# Patient Record
Sex: MALE | Race: BLACK OR AFRICAN AMERICAN | NOT HISPANIC OR LATINO | Employment: FULL TIME | URBAN - METROPOLITAN AREA
[De-identification: names, ages, dates, MRNs, and addresses within clinical notes are randomized per-mention and may not be internally consistent; named-entity substitution may affect disease eponyms.]

---

## 2024-01-16 ENCOUNTER — APPOINTMENT (OUTPATIENT)
Dept: PRIMARY CARE | Facility: CLINIC | Age: 29
End: 2024-01-16
Payer: COMMERCIAL

## 2024-02-02 ENCOUNTER — OFFICE VISIT (OUTPATIENT)
Dept: PRIMARY CARE | Facility: CLINIC | Age: 29
End: 2024-02-02
Payer: COMMERCIAL

## 2024-02-02 VITALS
BODY MASS INDEX: 27.15 KG/M2 | OXYGEN SATURATION: 95 % | WEIGHT: 173 LBS | HEIGHT: 67 IN | DIASTOLIC BLOOD PRESSURE: 74 MMHG | TEMPERATURE: 97.8 F | HEART RATE: 78 BPM | SYSTOLIC BLOOD PRESSURE: 131 MMHG

## 2024-02-02 DIAGNOSIS — R55 SYNCOPE, UNSPECIFIED SYNCOPE TYPE: Primary | ICD-10-CM

## 2024-02-02 PROCEDURE — 1036F TOBACCO NON-USER: CPT | Performed by: FAMILY MEDICINE

## 2024-02-02 PROCEDURE — 93000 ELECTROCARDIOGRAM COMPLETE: CPT | Performed by: FAMILY MEDICINE

## 2024-02-02 PROCEDURE — 99214 OFFICE O/P EST MOD 30 MIN: CPT | Performed by: FAMILY MEDICINE

## 2024-02-02 ASSESSMENT — LIFESTYLE VARIABLES
HOW OFTEN DO YOU HAVE A DRINK CONTAINING ALCOHOL: NEVER
HOW MANY STANDARD DRINKS CONTAINING ALCOHOL DO YOU HAVE ON A TYPICAL DAY: PATIENT DOES NOT DRINK
AUDIT-C TOTAL SCORE: 0
HOW OFTEN DO YOU HAVE SIX OR MORE DRINKS ON ONE OCCASION: NEVER
SKIP TO QUESTIONS 9-10: 1

## 2024-02-02 ASSESSMENT — COLUMBIA-SUICIDE SEVERITY RATING SCALE - C-SSRS
6. HAVE YOU EVER DONE ANYTHING, STARTED TO DO ANYTHING, OR PREPARED TO DO ANYTHING TO END YOUR LIFE?: NO
2. HAVE YOU ACTUALLY HAD ANY THOUGHTS OF KILLING YOURSELF?: NO
1. IN THE PAST MONTH, HAVE YOU WISHED YOU WERE DEAD OR WISHED YOU COULD GO TO SLEEP AND NOT WAKE UP?: NO

## 2024-02-02 ASSESSMENT — PATIENT HEALTH QUESTIONNAIRE - PHQ9
2. FEELING DOWN, DEPRESSED OR HOPELESS: NOT AT ALL
SUM OF ALL RESPONSES TO PHQ9 QUESTIONS 1 & 2: 0
1. LITTLE INTEREST OR PLEASURE IN DOING THINGS: NOT AT ALL

## 2024-02-02 NOTE — PROGRESS NOTES
"1 year ago had 2 episodes of syncope both happened at work after prolonged standing for greater than an hour preceded by diaphoresis but not by chest pain palpitations nausea.  Denied incontinence of urine incontinence of stool tongue biting witnessed seizure activity headache dysarthria aphasia focal weakness.    Currently doing well working and exercising without difficulty      /74   Pulse 78   Temp 36.6 °C (97.8 °F)   Ht 1.702 m (5' 7\")   Wt 78.5 kg (173 lb)   SpO2 95%   BMI 27.10 kg/m²   Lying blood pressure 126/74 with a pulse of 68   sitting blood pressure 123/79 with a pulse of 71   standing blood pressure 129/75 with a pulse of 73    Appears comfortable  No retractions  Skin without pallor petechia icterus cyanosis  Neck without JVD thyromegaly bruits  Chest wall nontender  Chest clear to auscultation without rales rhonchi wheeze  Heart regular rate and rhythm without murmur  Abdomen soft nondistended nontender without organomegaly or mass  Extremities without erythema edema Homans or cord  Peripheral pulses palpable  Neuro intact      [facility] forms filled out    "

## 2024-02-05 PROBLEM — J06.9 UPPER RESPIRATORY TRACT INFECTION: Status: ACTIVE | Noted: 2024-02-05

## 2024-02-05 PROBLEM — U09.9 POST-ACUTE COVID-19 SYNDROME: Status: ACTIVE | Noted: 2024-02-05

## 2024-02-05 PROBLEM — R55 VASOVAGAL SYMPTOM: Status: ACTIVE | Noted: 2024-02-05

## 2024-02-05 PROBLEM — U07.1 COVID-19: Status: ACTIVE | Noted: 2024-02-05

## 2024-02-05 PROBLEM — S43.439A GLENOID LABRUM TEAR: Status: ACTIVE | Noted: 2024-02-05

## 2024-02-05 PROBLEM — H60.509 ACUTE OTITIS EXTERNA: Status: ACTIVE | Noted: 2024-02-05

## 2024-02-05 PROBLEM — M79.10 MUSCLE PAIN: Status: ACTIVE | Noted: 2024-02-05

## 2024-02-05 PROBLEM — L72.0 EPIDERMOID CYST: Status: ACTIVE | Noted: 2024-02-05

## 2024-02-09 ENCOUNTER — APPOINTMENT (OUTPATIENT)
Dept: PRIMARY CARE | Facility: CLINIC | Age: 29
End: 2024-02-09
Payer: COMMERCIAL

## 2024-02-09 ENCOUNTER — APPOINTMENT (OUTPATIENT)
Dept: CARDIOLOGY | Facility: CLINIC | Age: 29
End: 2024-02-09
Payer: COMMERCIAL

## 2024-02-16 ENCOUNTER — OFFICE VISIT (OUTPATIENT)
Dept: CARDIOLOGY | Facility: CLINIC | Age: 29
End: 2024-02-16
Payer: COMMERCIAL

## 2024-02-16 ENCOUNTER — APPOINTMENT (OUTPATIENT)
Dept: PRIMARY CARE | Facility: CLINIC | Age: 29
End: 2024-02-16
Payer: COMMERCIAL

## 2024-02-16 VITALS
SYSTOLIC BLOOD PRESSURE: 116 MMHG | RESPIRATION RATE: 16 BRPM | WEIGHT: 173 LBS | OXYGEN SATURATION: 98 % | HEART RATE: 103 BPM | BODY MASS INDEX: 27.1 KG/M2 | DIASTOLIC BLOOD PRESSURE: 82 MMHG

## 2024-02-16 DIAGNOSIS — R55 SYNCOPE, UNSPECIFIED SYNCOPE TYPE: ICD-10-CM

## 2024-02-16 DIAGNOSIS — R55 VASOVAGAL SYMPTOM: Primary | ICD-10-CM

## 2024-02-16 PROCEDURE — 1036F TOBACCO NON-USER: CPT | Performed by: STUDENT IN AN ORGANIZED HEALTH CARE EDUCATION/TRAINING PROGRAM

## 2024-02-16 PROCEDURE — 99203 OFFICE O/P NEW LOW 30 MIN: CPT | Performed by: STUDENT IN AN ORGANIZED HEALTH CARE EDUCATION/TRAINING PROGRAM

## 2024-02-16 ASSESSMENT — ENCOUNTER SYMPTOMS
PALPITATIONS: 0
HEMATOLOGIC/LYMPHATIC NEGATIVE: 1
RESPIRATORY NEGATIVE: 1
PND: 0
EYES NEGATIVE: 1
DYSPNEA ON EXERTION: 0
ORTHOPNEA: 0
SYNCOPE: 1
ALLERGIC/IMMUNOLOGIC NEGATIVE: 1
ENDOCRINE NEGATIVE: 1
GASTROINTESTINAL NEGATIVE: 1
PSYCHIATRIC NEGATIVE: 1
NEAR-SYNCOPE: 0
CONSTITUTIONAL NEGATIVE: 1
MUSCULOSKELETAL NEGATIVE: 1

## 2024-02-16 NOTE — PATIENT INSTRUCTIONS
We will see you back in heart clinic after your heart ultrasound (echocardiogram). Most likely your passing out episodes were from vaso-vagal syncope (reflex syncope).     If you have any further syncope we would then consider a heart monitor.     If you have any questions; please call my nurse Perlita; her contact information is below.     Thank you for your visit today. Please contact our office (via MyChart or phone) with any additional questions.     [facility]    Perlita, RN/Clinic Nurse for:    Dr. Pauline Ritter    [address], [address]  [place] [address]    Phone: [phone] Press Option 5 then Option 3 to speak with the Clinic Nurse (Perlita)    _____    To Reach:    Billing Questions -    [phone]  Scheduling / Rescheduling -  Option 1  Refills / Medication Requests -  Option 3  General Office /  -  Option 4  Results -     Option 6  Medical Records -    Option 7  Repeat Options -    Option 9

## 2024-02-16 NOTE — PROGRESS NOTES
" Cardiology New Patient History and Physical    Reason for referral: syncope    HPI: Maco Quiros is a 28 y.o.  male who presents today for history of syncope. Past medical history of syncope (June / July 2023), hx SARS-COVID; hx ADD (previously on Adderall).     Maco presented to cardiology clinic on 2/16/2024.  Notes syncopal episodes in June / July 2023. Syncopal events occurred at work while standing; felt \"warm / hot / distant sounds\" just prior to episodes.  Patient had been on feet for ~ 30-45 minutes.  Per patient he was unconscious for ~ couple of seconds.  Felt back to normal ~ 30 minutes after events.  No further episodes.  Feeling well otherwise.     No known family history of cardiac issues (adopted). No personal history of arrhythmias.  No history of VTE.      Past Medical History:   - As above    Surgical History:   He has a past surgical history that includes Other surgical history (11/16/2021).    Family History:   Family History   Problem Relation Name Age of Onset    No Known Problems Mother      No Known Problems Father       Unknown: adopted    Allergies:  Pollen extracts     Social History:   - non-smoker; rare alcohol use; no illicit drug  - Employed: works at [employer]    Prior Cardiovascular Testing (personally reviewed):     ECG (2/2/2024)- Normal sinus rhythm    Review of Systems:  Review of Systems   Constitutional: Negative.   HENT: Negative.     Eyes: Negative.    Cardiovascular:  Positive for syncope. Negative for chest pain, dyspnea on exertion, near-syncope, orthopnea, palpitations and paroxysmal nocturnal dyspnea.        Hx of two syncopal events in June / July 2023   Respiratory: Negative.     Endocrine: Negative.    Hematologic/Lymphatic: Negative.    Skin: Negative.    Musculoskeletal: Negative.    Gastrointestinal: Negative.    Genitourinary: Negative.    Psychiatric/Behavioral: Negative.     Allergic/Immunologic: Negative.        Objective     Outpatient " Medications:  No current outpatient medications on file.     Last Recorded Vitals  /82 (BP Location: Right arm, Patient Position: Sitting)   Pulse 103   Resp 16   Wt 78.5 kg (173 lb)   SpO2 98%   BMI 27.10 kg/m²     Physical Exam:  Physical Exam  Constitutional:       General: He is not in acute distress.  HENT:      Head: Normocephalic.      Mouth/Throat:      Mouth: Mucous membranes are moist.   Eyes:      Extraocular Movements: Extraocular movements intact.      Conjunctiva/sclera: Conjunctivae normal.   Neck:      Vascular: No JVD.   Cardiovascular:      Rate and Rhythm: Normal rate and regular rhythm.      Heart sounds: No murmur heard.  Pulmonary:      Effort: Pulmonary effort is normal. No respiratory distress.      Breath sounds: Normal breath sounds.   Abdominal:      General: Bowel sounds are normal. There is no distension.      Palpations: Abdomen is soft.   Musculoskeletal:         General: No swelling.   Skin:     General: Skin is warm and dry.   Neurological:      General: No focal deficit present.      Mental Status: He is alert.      Cranial Nerves: No cranial nerve deficit.      Motor: No weakness.   Psychiatric:         Mood and Affect: Mood normal.         Behavior: Behavior normal.       Lab Review:    Lab Results   Component Value Date    GLUCOSE 67 (L) 11/16/2021    CALCIUM 9.2 11/16/2021     11/16/2021    K 4.3 11/16/2021    CO2 32 11/16/2021     11/16/2021    BUN 8 11/16/2021    CREATININE 0.93 11/16/2021       Lab Results   Component Value Date    WBC 2.5 (L) 11/16/2021    HGB 12.7 (L) 11/16/2021    HCT 38.2 (L) 11/16/2021    MCV 80 11/16/2021     11/16/2021         Lab Results   Component Value Date    TSH 0.87 11/16/2021       Assessment:   28 y.o.  male who presents today for history of syncope. Past medical history of syncope (June / July 2023), hx SARS-COVID; hx ADD (previously on Adderall).     Patient with multiple syncopal episodes in  "summer 2023.  Episodes consistent with vasovagal syncope.  Structural heart disease transthoracic echocardiogram.  Patient had no further syncopal episodes but then recommend a 14-day Holter monitor to exclude occult arrhythmias.    Overall Plan:  1.  Syncope, most likely vasovagal  - Check transthoracic echocardiogram to exclude structural heart abnormalities  - Patient has recurrent syncope ordered for today Holter monitor to exclude occult arrhythmia    2. Discussed \"red flag\" symptoms that should prompt immediate medical attention; patient verbalized understanding      3.  Health maintenance-agree with checking fasting lipid    Disposition: Return to cardiology clinic after TTE    Jakub Carpenter MD        "

## 2024-03-22 ENCOUNTER — APPOINTMENT (OUTPATIENT)
Dept: CARDIOLOGY | Facility: CLINIC | Age: 29
End: 2024-03-22
Payer: COMMERCIAL

## 2024-03-29 ENCOUNTER — HOSPITAL ENCOUNTER (OUTPATIENT)
Dept: CARDIOLOGY | Facility: CLINIC | Age: 29
Discharge: HOME | End: 2024-03-29
Payer: COMMERCIAL

## 2024-03-29 DIAGNOSIS — R55 SYNCOPE, UNSPECIFIED SYNCOPE TYPE: ICD-10-CM

## 2024-03-29 PROCEDURE — 93306 TTE W/DOPPLER COMPLETE: CPT

## 2024-03-29 PROCEDURE — 93306 TTE W/DOPPLER COMPLETE: CPT | Performed by: STUDENT IN AN ORGANIZED HEALTH CARE EDUCATION/TRAINING PROGRAM

## 2024-03-30 LAB
AORTIC VALVE MEAN GRADIENT: 3.3 MMHG
AORTIC VALVE PEAK VELOCITY: 1.21 M/S
AV PEAK GRADIENT: 5.9 MMHG
AVA (PEAK VEL): 2.51 CM2
AVA (VTI): 2.15 CM2
EJECTION FRACTION APICAL 4 CHAMBER: 65.3
EJECTION FRACTION: 61 %
LEFT VENTRICLE INTERNAL DIMENSION DIASTOLE: 4.39 CM (ref 3.5–6)
LEFT VENTRICULAR OUTFLOW TRACT DIAMETER: 2.03 CM
MITRAL VALVE E/A RATIO: 1.17
RIGHT VENTRICLE FREE WALL PEAK S': 0.12 CM/S
RIGHT VENTRICLE PEAK SYSTOLIC PRESSURE: 17.4 MMHG
TRICUSPID ANNULAR PLANE SYSTOLIC EXCURSION: 1.7 CM

## 2024-04-02 ENCOUNTER — TELEPHONE (OUTPATIENT)
Dept: CARDIOLOGY | Facility: CLINIC | Age: 29
End: 2024-04-02
Payer: COMMERCIAL

## 2024-04-02 NOTE — TELEPHONE ENCOUNTER
----- Message from Jakub Carpenter MD sent at 4/2/2024  2:10 PM EDT -----  Please let patient know his echocardiogram was normal.     Jakub Carpenter MD    ----- Message -----  From: Raymond Syngo - Cardiology Results In  Sent: 3/30/2024   3:15 PM EDT  To: Jakub Carpenter MD

## 2024-04-05 ENCOUNTER — APPOINTMENT (OUTPATIENT)
Dept: CARDIOLOGY | Facility: CLINIC | Age: 29
End: 2024-04-05
Payer: COMMERCIAL

## 2024-05-06 ENCOUNTER — OFFICE VISIT (OUTPATIENT)
Dept: PRIMARY CARE | Facility: CLINIC | Age: 29
End: 2024-05-06
Payer: COMMERCIAL

## 2024-05-06 VITALS
BODY MASS INDEX: 25.9 KG/M2 | SYSTOLIC BLOOD PRESSURE: 138 MMHG | WEIGHT: 165 LBS | OXYGEN SATURATION: 96 % | DIASTOLIC BLOOD PRESSURE: 72 MMHG | HEIGHT: 67 IN | TEMPERATURE: 97.4 F | HEART RATE: 69 BPM

## 2024-05-06 DIAGNOSIS — S01.00XD OPEN WOUND OF SCALP, UNSPECIFIED OPEN WOUND TYPE, SUBSEQUENT ENCOUNTER: ICD-10-CM

## 2024-05-06 DIAGNOSIS — L65.9 ALOPECIA: Primary | ICD-10-CM

## 2024-05-06 PROCEDURE — 1036F TOBACCO NON-USER: CPT | Performed by: FAMILY MEDICINE

## 2024-05-06 PROCEDURE — 99213 OFFICE O/P EST LOW 20 MIN: CPT | Performed by: FAMILY MEDICINE

## 2024-05-06 ASSESSMENT — COLUMBIA-SUICIDE SEVERITY RATING SCALE - C-SSRS
2. HAVE YOU ACTUALLY HAD ANY THOUGHTS OF KILLING YOURSELF?: NO
6. HAVE YOU EVER DONE ANYTHING, STARTED TO DO ANYTHING, OR PREPARED TO DO ANYTHING TO END YOUR LIFE?: NO
1. IN THE PAST MONTH, HAVE YOU WISHED YOU WERE DEAD OR WISHED YOU COULD GO TO SLEEP AND NOT WAKE UP?: NO

## 2024-05-06 NOTE — PROGRESS NOTES
"29-year-old male here for wound check after obtaining multiunit  On 4/26/2024 currently doing well    Following wound care procedures    Denies redness swelling discharge from occipital wound or from recipient sites    He was instructed not to work until sutures are removed which will be done on May 11          /72   Pulse 69   Temp 36.3 °C (97.4 °F)   Ht 1.702 m (5' 7\")   Wt 74.8 kg (165 lb)   SpO2 96%   BMI 25.84 kg/m²       10 inch plus occipital wound closed with interrupted sutures with wound well-approximated without redness swelling or discharge.  Neck without adenopathy.  Neck supple  "

## 2024-05-06 NOTE — PATIENT INSTRUCTIONS
Continue wound care.  Follow-up with his dermatologist office for suture excision may resume work on May 12

## 2024-05-17 ENCOUNTER — OFFICE VISIT (OUTPATIENT)
Dept: PRIMARY CARE | Facility: CLINIC | Age: 29
End: 2024-05-17
Payer: COMMERCIAL

## 2024-05-17 VITALS
OXYGEN SATURATION: 95 % | HEIGHT: 67 IN | TEMPERATURE: 97.2 F | WEIGHT: 168 LBS | BODY MASS INDEX: 26.37 KG/M2 | SYSTOLIC BLOOD PRESSURE: 117 MMHG | DIASTOLIC BLOOD PRESSURE: 74 MMHG | HEART RATE: 65 BPM

## 2024-05-17 DIAGNOSIS — L65.9 ALOPECIA: Primary | ICD-10-CM

## 2024-05-17 PROCEDURE — 99213 OFFICE O/P EST LOW 20 MIN: CPT | Performed by: FAMILY MEDICINE

## 2024-05-17 ASSESSMENT — PATIENT HEALTH QUESTIONNAIRE - PHQ9
SUM OF ALL RESPONSES TO PHQ9 QUESTIONS 1 & 2: 0
2. FEELING DOWN, DEPRESSED OR HOPELESS: NOT AT ALL
1. LITTLE INTEREST OR PLEASURE IN DOING THINGS: NOT AT ALL

## 2024-05-17 ASSESSMENT — COLUMBIA-SUICIDE SEVERITY RATING SCALE - C-SSRS
2. HAVE YOU ACTUALLY HAD ANY THOUGHTS OF KILLING YOURSELF?: NO
1. IN THE PAST MONTH, HAVE YOU WISHED YOU WERE DEAD OR WISHED YOU COULD GO TO SLEEP AND NOT WAKE UP?: NO
6. HAVE YOU EVER DONE ANYTHING, STARTED TO DO ANYTHING, OR PREPARED TO DO ANYTHING TO END YOUR LIFE?: NO

## 2024-05-17 NOTE — PROGRESS NOTES
29-year-old male here for wound check after obtaining multiunit  On 4/26/2024 currently doing well    Following wound care procedures    Denies redness swelling discharge from occipital wound or from recipient sites  Sutures were removed May 11 without difficulty      .mfvitals    10 inch plus occipital wound well-approximated without redness swelling or discharge.  Neck without adenopathy.  Neck supple

## 2024-05-24 ENCOUNTER — APPOINTMENT (OUTPATIENT)
Dept: CARDIOLOGY | Facility: CLINIC | Age: 29
End: 2024-05-24
Payer: COMMERCIAL

## 2024-06-14 ENCOUNTER — APPOINTMENT (OUTPATIENT)
Dept: CARDIOLOGY | Facility: CLINIC | Age: 29
End: 2024-06-14
Payer: COMMERCIAL

## 2024-07-03 ENCOUNTER — APPOINTMENT (OUTPATIENT)
Dept: CARDIOLOGY | Facility: CLINIC | Age: 29
End: 2024-07-03
Payer: COMMERCIAL

## 2024-07-05 ENCOUNTER — TELEPHONE (OUTPATIENT)
Dept: CARDIOLOGY | Facility: CLINIC | Age: 29
End: 2024-07-05

## 2024-07-05 NOTE — TELEPHONE ENCOUNTER
Patient left message need to schedule a follow up missed appointment. Patient was called left message on his voice machine to call back and schedule .

## 2024-08-23 ENCOUNTER — APPOINTMENT (OUTPATIENT)
Dept: CARDIOLOGY | Facility: CLINIC | Age: 29
End: 2024-08-23
Payer: COMMERCIAL

## 2024-08-23 DIAGNOSIS — R55 VASOVAGAL SYMPTOM: ICD-10-CM

## 2024-08-23 DIAGNOSIS — R55 SYNCOPE, UNSPECIFIED SYNCOPE TYPE: ICD-10-CM

## 2024-08-23 PROCEDURE — 99212 OFFICE O/P EST SF 10 MIN: CPT | Performed by: STUDENT IN AN ORGANIZED HEALTH CARE EDUCATION/TRAINING PROGRAM

## 2024-10-10 ASSESSMENT — ENCOUNTER SYMPTOMS
ORTHOPNEA: 0
MUSCULOSKELETAL NEGATIVE: 1
RESPIRATORY NEGATIVE: 1
HEMATOLOGIC/LYMPHATIC NEGATIVE: 1
PND: 0
SYNCOPE: 0
PALPITATIONS: 0
ENDOCRINE NEGATIVE: 1
GASTROINTESTINAL NEGATIVE: 1
ALLERGIC/IMMUNOLOGIC NEGATIVE: 1
CONSTITUTIONAL NEGATIVE: 1
PSYCHIATRIC NEGATIVE: 1
NEAR-SYNCOPE: 0
EYES NEGATIVE: 1
DYSPNEA ON EXERTION: 0

## 2024-10-10 NOTE — PROGRESS NOTES
" Cardiology Established Outpatient Visit    Reason for referral: syncope    HPI: Maco Quiros \"Mike\" is a 29 y.o.  male who presents today for history of syncope. Past medical history of syncope (June / July 2023), hx SARS-COVID; hx ADD (previously on Adderall).     Maco presented to cardiology clinic on 2/16/2024.  Notes syncopal episodes in June / July 2023. Syncopal events occurred at work while standing; felt \"warm / hot / distant sounds\" just prior to episodes.  Patient had been on feet for ~ 30-45 minutes.  Per patient he was unconscious for ~ couple of seconds.  Felt back to normal ~ 30 minutes after events.  No further episodes.  Feeling well otherwise.  No known family history of cardiac issues (adopted). No personal history of arrhythmias.  No history of VTE.      Mike returned to cardiology clinic on 8/23/2024.  Transthoracic echocardiogram showed normal cardiac structure and function.  No further episodes of syncope prior episode thought to be vasovagal in nature.  Discussed the option of Holter monitor if symptoms return; patient like to defer for now.  Follow-up as needed in cardiology clinic.    Past Medical History:   - As above    Surgical History:   He has a past surgical history that includes Other surgical history (11/16/2021).    Family History:   Family History   Problem Relation Name Age of Onset    No Known Problems Mother      No Known Problems Father       Unknown: adopted    Allergies:  Pollen extracts     Social History:   - non-smoker; rare alcohol use; no illicit drug  - Employed: works at [employer]    Prior Cardiovascular Testing (personally reviewed):     TTE (3/29/2024)   1. Normal Echocardiogram.   2. Left ventricular systolic function is normal with a 60-65% estimated ejection fraction.   3. RVSP within normal limits.    ECG (2/2/2024)- Normal sinus rhythm    Review of Systems:  Review of Systems   Constitutional: Negative.   HENT: Negative.     Eyes: Negative.  "   Cardiovascular:  Negative for chest pain, dyspnea on exertion, near-syncope, orthopnea, palpitations, paroxysmal nocturnal dyspnea and syncope.        Hx of two syncopal events in June / July 2023   Respiratory: Negative.     Endocrine: Negative.    Hematologic/Lymphatic: Negative.    Skin: Negative.    Musculoskeletal: Negative.    Gastrointestinal: Negative.    Genitourinary: Negative.    Psychiatric/Behavioral: Negative.     Allergic/Immunologic: Negative.        Objective     Outpatient Medications:  No current outpatient medications on file.     Last Recorded Vitals  There were no vitals taken for this visit.    Physical Exam:  Physical Exam  Constitutional:       General: He is not in acute distress.  HENT:      Head: Normocephalic.      Mouth/Throat:      Mouth: Mucous membranes are moist.   Eyes:      Extraocular Movements: Extraocular movements intact.      Conjunctiva/sclera: Conjunctivae normal.   Neck:      Vascular: No carotid bruit or JVD.   Cardiovascular:      Rate and Rhythm: Normal rate and regular rhythm.      Heart sounds: No murmur heard.  Pulmonary:      Effort: Pulmonary effort is normal. No respiratory distress.      Breath sounds: Normal breath sounds.   Abdominal:      General: Bowel sounds are normal. There is no distension.      Palpations: Abdomen is soft.   Musculoskeletal:         General: No swelling.   Skin:     General: Skin is warm and dry.   Neurological:      General: No focal deficit present.      Mental Status: He is alert.      Cranial Nerves: No cranial nerve deficit.      Motor: No weakness.   Psychiatric:         Mood and Affect: Mood normal.         Behavior: Behavior normal.         Lab Review:    Lab Results   Component Value Date    GLUCOSE 67 (L) 11/16/2021    CALCIUM 9.2 11/16/2021     11/16/2021    K 4.3 11/16/2021    CO2 32 11/16/2021     11/16/2021    BUN 8 11/16/2021    CREATININE 0.93 11/16/2021       Lab Results   Component Value Date    WBC 2.5  "(L) 11/16/2021    HGB 12.7 (L) 11/16/2021    HCT 38.2 (L) 11/16/2021    MCV 80 11/16/2021     11/16/2021         Lab Results   Component Value Date    TSH 0.87 11/16/2021       Assessment:   29 y.o.  male who presents today for history of syncope. Past medical history of syncope (June / July 2023), hx SARS-COVID; hx ADD (previously on Adderall).     Mike returned to cardiology clinic on 8/23/2024.  Transthoracic echocardiogram showed normal cardiac structure and function.  No further episodes of syncope prior episode thought to be vasovagal in nature.  Discussed the option of Holter monitor if symptoms return; patient like to defer for now.  Follow-up as needed in cardiology clinic.    Overall Plan:  1.  Syncope, most likely vasovagal  - Check transthoracic echocardiogram to exclude structural heart abnormalities  - Patient has recurrent syncope ordered for today Holter monitor to exclude occult arrhythmia    2. Discussed \"red flag\" symptoms that should prompt immediate medical attention; patient verbalized understanding    Disposition: Return to cardiology clinic as needed    Jakub Carpenter MD        "

## 2024-11-04 ENCOUNTER — APPOINTMENT (OUTPATIENT)
Dept: PRIMARY CARE | Facility: CLINIC | Age: 29
End: 2024-11-04
Payer: COMMERCIAL

## 2025-02-07 ENCOUNTER — APPOINTMENT (OUTPATIENT)
Dept: PRIMARY CARE | Facility: CLINIC | Age: 30
End: 2025-02-07
Payer: COMMERCIAL

## 2025-07-03 ENCOUNTER — APPOINTMENT (OUTPATIENT)
Dept: PRIMARY CARE | Facility: CLINIC | Age: 30
End: 2025-07-03
Payer: COMMERCIAL

## 2025-08-20 ENCOUNTER — OFFICE VISIT (OUTPATIENT)
Dept: URGENT CARE | Age: 30
End: 2025-08-20
Payer: COMMERCIAL

## 2025-08-20 VITALS
DIASTOLIC BLOOD PRESSURE: 65 MMHG | RESPIRATION RATE: 17 BRPM | TEMPERATURE: 98.4 F | HEART RATE: 66 BPM | OXYGEN SATURATION: 97 % | HEIGHT: 68 IN | BODY MASS INDEX: 25.01 KG/M2 | SYSTOLIC BLOOD PRESSURE: 119 MMHG | WEIGHT: 165 LBS

## 2025-08-20 DIAGNOSIS — H60.502 ACUTE OTITIS EXTERNA OF LEFT EAR, UNSPECIFIED TYPE: Primary | ICD-10-CM

## 2025-08-20 DIAGNOSIS — R22.0 LOCALIZED SWELLING OF HEAD: ICD-10-CM

## 2025-08-20 RX ORDER — CIPROFLOXACIN AND DEXAMETHASONE 3; 1 MG/ML; MG/ML
4 SUSPENSION/ DROPS AURICULAR (OTIC) 2 TIMES DAILY
Qty: 7.5 ML | Refills: 0 | Status: SHIPPED | OUTPATIENT
Start: 2025-08-20 | End: 2025-08-27

## 2025-08-20 RX ORDER — METHYLPREDNISOLONE 4 MG/1
TABLET ORAL
Qty: 21 TABLET | Refills: 0 | Status: SHIPPED | OUTPATIENT
Start: 2025-08-20

## 2025-08-20 ASSESSMENT — ENCOUNTER SYMPTOMS
FATIGUE: 0
NUMBNESS: 0
HEADACHES: 1
CHILLS: 0
DIZZINESS: 0
SPEECH DIFFICULTY: 0
WEAKNESS: 0

## 2025-09-09 ENCOUNTER — APPOINTMENT (OUTPATIENT)
Dept: PRIMARY CARE | Facility: CLINIC | Age: 30
End: 2025-09-09
Payer: COMMERCIAL

## 2025-11-07 ENCOUNTER — APPOINTMENT (OUTPATIENT)
Dept: SLEEP MEDICINE | Facility: CLINIC | Age: 30
End: 2025-11-07
Payer: COMMERCIAL